# Patient Record
Sex: FEMALE | URBAN - METROPOLITAN AREA
[De-identification: names, ages, dates, MRNs, and addresses within clinical notes are randomized per-mention and may not be internally consistent; named-entity substitution may affect disease eponyms.]

---

## 2017-02-02 ENCOUNTER — OFFICE VISIT - HEALTHEAST (OUTPATIENT)
Dept: NEUROSURGERY | Facility: CLINIC | Age: 32
End: 2017-02-02

## 2017-02-02 DIAGNOSIS — M54.50 LOW BACK PAIN: ICD-10-CM

## 2017-12-31 ENCOUNTER — HEALTH MAINTENANCE LETTER (OUTPATIENT)
Age: 32
End: 2017-12-31

## 2018-01-16 ENCOUNTER — OFFICE VISIT (OUTPATIENT)
Dept: ORTHOPEDICS | Facility: CLINIC | Age: 33
End: 2018-01-16
Payer: COMMERCIAL

## 2018-01-16 DIAGNOSIS — M25.362 PATELLAR INSTABILITY OF LEFT KNEE: Primary | ICD-10-CM

## 2018-01-16 ASSESSMENT — ENCOUNTER SYMPTOMS
ARTHRALGIAS: 1
BACK PAIN: 1
STIFFNESS: 0
MUSCLE WEAKNESS: 0
NECK PAIN: 1
MYALGIAS: 0
JOINT SWELLING: 0
MUSCLE CRAMPS: 0

## 2018-01-16 NOTE — PROGRESS NOTES
Chief Concern/Diagnosis: Follow-up Bilateral patellar instability    Prior Surgeries:    1. None    Subjective:  Patient returns to see us 16 months since her last appointment. She reports that she did a short course of physical therapy, however did not keep up with her as she had a lumbar disc herniation which was the primary focus of her medical attention at that time.     She reports that she continues to do weight lifting as her primary mode of exercise. Unfortunate she continues to have bilateral patellar instability events, possibly 4 times per week. Injuries or events she is able quickly fire her quadriceps to stabilize the patella from Mason dislocating, however she notes that she did have a clay left knee subluxation/dislocation in October 2017 which caused her to have 2 months of pain after the fact.    She feels that both knees are equally affected by this instability. At baseline she has no pain at all, however after any subluxation event she has significant pain for a prolonged period which causes her to be very apprehensive.  Taping with physical therapy did prove to be very helpful and decreased her apprehension significantly.     She does not have any issues with ascending or descending stairs.      Physical Examination:    Gen:  Alert, no acute distress, well nourished, appears stated age    Psych:  Normal affect, nonpressured speech    Musculoskeletal:    RLE   No gross deformity, no effusion of knee   Nontender to palpation of entire LE   ROM:  Flex 0 - 141   Motor: Hip flexors, quadriceps, leg flexors, EHL, TA, FHL, GS 5/5   Moderate J sign as the patella engaged the trochlea   Apprehension with lateral patellar translation   3 quadrants patellar mobility laterally, patient stops prior to endpoint secondary to apprehension   Anterior & posterior drawer stable with no pain provoked   MCL/LCL stable without pain to valgus/varus stress   Circulation: palpable DP, TP, foot warm       LLE   No gross  deformity, no effusion of knee   Nontender to palpation of entire LE   ROM:  Flex 0 - 143   Motor: Hip flexors, quadriceps, leg flexors, EHL, TA, FHL, GS 5/5   Mild J sign as the patella engaged the trochlea   Apprehension with lateral patellar translation   3 quadrants patellar mobility laterally, patient stops prior to endpoint secondary to apprehension   Anterior & posterior drawer stable with no pain provoked   MCL/LCL stable without pain to valgus/varus stress   Circulation: palpable DP, TP, foot warm     Beighton score of 4/9 for bilateral elbows and thumbs. Of note no knee hyperextension is present bilaterally.    Imaging:  No new imaging present. Prior CT scan does show a TT TG on the right of 21 mm, left 22 mm. Femoral anteversion of 34 on the right, 12 on the left. Tibial torsion of 45 on the right, 41 on the left. No patella luis a.    Assessment:   32 year old female with the followin. Bilateral patellar instability.  No pain between subluxation/dislocation events.    Recommendations:  1. Discussed we could try another course of physical therapy focused on the patellofemoral articulation. The patient would like to proceed with this at [facility]. We'll place a referral for this today.   2.  we also discussed surgical options for management of her instability. In her case, though she does have some abnormal bony architecture, we do think that an isolated NPFL reconstruction with lateral retinacular lengthening would be indicated and successful on both sides.  The bilateral procedures could be spaced 6 weeks apart if she would like to do that.  We will do the preoperative teaching today if she would like to proceed with this near future.  3. The time being the patient would like to discuss with her work as well as her  regarding moving forward with the surgery. If she would like to proceed, she may give us a call or an email and we can set a surgery date.  4. The patient does have a history  of a TIA and we therefore recommend that she see her hematologist prior to surgery to discuss  5. If she does proceed with surgery, we anticipate 2-4 weeks she will need to be off of work depending on whether or not she may modify her work to be primarily sitting without pushing patient beds or being .    Total visit duration: 30 min. > 50% time spent in providing direct care and counseling.     Seen with Dr. Brown, documentation by:    Alejandro Bullard MD  Orthopaedic Surgery PGY-4    I have personally examined this patient and have reviewed the clinical presentation and progress note with the resident.  I agree with the treatment plan as outlined.  The plan was formulated with the resident on the day of the resident dictation.    Eunice Brown     Answers for HPI/ROS submitted by the patient on 1/16/2018   General Symptoms: No  Skin Symptoms: No  HENT Symptoms: No  EYE SYMPTOMS: No  HEART SYMPTOMS: No  LUNG SYMPTOMS: No  INTESTINAL SYMPTOMS: No  URINARY SYMPTOMS: No  GYNECOLOGIC SYMPTOMS: No  BREAST SYMPTOMS: No  SKELETAL SYMPTOMS: Yes  BLOOD SYMPTOMS: No  NERVOUS SYSTEM SYMPTOMS: No  MENTAL HEALTH SYMPTOMS: No  Back pain: Yes  Muscle aches: No  Neck pain: Yes  Swollen joints: No  Joint pain: Yes  Bone pain: No  Muscle cramps: No  Muscle weakness: No  Joint stiffness: No  Bone fracture: No

## 2018-01-16 NOTE — Clinical Note
1/16/2018       RE: Taina Sher  [address]  [place] [place] [facility] [address]     Dear Colleague,    Thank you for referring your patient, Taina Sher, to the [facility] at [facility]. Please see a copy of my visit note below.    Chief Concern/Diagnosis: Follow-up Bilateral patellar instability    Prior Surgeries:    1. None    Subjective:  Patient returns to see us 16 months since her last appointment. She reports that she did a short course of physical therapy, however did not keep up with her as she had a lumbar disc herniation which was the primary focus of her medical attention at that time.     She reports that she continues to do weight lifting as her primary mode of exercise. Unfortunate she continues to have bilateral patellar instability events, possibly 4 times per week. Injuries or events she is able quickly fire her quadriceps to stabilize the patella from Mason dislocating, however she notes that she did have a clay left knee subluxation/dislocation in October 2017 which caused her to have 2 months of pain after the fact.    She feels that both knees are equally affected by this instability. At baseline she has no pain at all, however after any subluxation event she has significant pain for a prolonged period which causes her to be very apprehensive.  Taping with physical therapy did prove to be very helpful and decreased her apprehension significantly.     She does not have any issues with ascending or descending stairs.      Physical Examination:    Gen:  Alert, no acute distress, well nourished, appears stated age    Psych:  Normal affect, nonpressured speech    Musculoskeletal:    RLE   No gross deformity, no effusion of knee   Nontender to palpation of entire LE   ROM:  Flex 0 - 141   Motor: Hip flexors, quadriceps, leg flexors, EHL, TA, FHL, GS 5/5   Moderate J sign as the patella engaged the trochlea   Apprehension with lateral  patellar translation   3 quadrants patellar mobility laterally, patient stops prior to endpoint secondary to apprehension   Anterior & posterior drawer stable with no pain provoked   MCL/LCL stable without pain to valgus/varus stress   Circulation: palpable DP, TP, foot warm       LLE   No gross deformity, no effusion of knee   Nontender to palpation of entire LE   ROM:  Flex 0 - 143   Motor: Hip flexors, quadriceps, leg flexors, EHL, TA, FHL, GS 5/5   Mild J sign as the patella engaged the trochlea   Apprehension with lateral patellar translation   3 quadrants patellar mobility laterally, patient stops prior to endpoint secondary to apprehension   Anterior & posterior drawer stable with no pain provoked   MCL/LCL stable without pain to valgus/varus stress   Circulation: palpable DP, TP, foot warm     Beighton score of 4/9 for bilateral elbows and thumbs. Of note no knee hyperextension is present bilaterally.    Imaging:  No new imaging present. Prior CT scan does show a TT TG on the right of 21 mm, left 22 mm. Femoral anteversion of 34 on the right, 12 on the left. Tibial torsion of 45 on the right, 41 on the left. No patella luis a.    Assessment:   32 year old female with the followin. Bilateral patellar instability.  No pain between subluxation/dislocation events.    Recommendations:  1. Discussed we could try another course of physical therapy focused on the patellofemoral articulation. The patient would like to proceed with this at [facility]. We'll place a referral for this today.   2.  we also discussed surgical options for management of her instability. In her case, though she does have some abnormal bony architecture, we do think that an isolated NPFL reconstruction with lateral retinacular lengthening would be indicated and successful on both sides.  The bilateral procedures could be spaced 6 weeks apart if she would like to do that.  We will do the preoperative teaching today if she would like  to proceed with this near future.  3. The time being the patient would like to discuss with her work as well as her  regarding moving forward with the surgery. If she would like to proceed, she may give us a call or an email and we can set a surgery date.  4. The patient does have a history of a TIA and we therefore recommend that she see her hematologist prior to surgery to discuss  5. If she does proceed with surgery, we anticipate 2-4 weeks she will need to be off of work depending on whether or not she may modify her work to be primarily sitting without pushing patient beds or being .    Total visit duration: 30 min. > 50% time spent in providing direct care and counseling.     Seen with Dr. Brown, documentation by:    Alejandro Bullard MD  Orthopaedic Surgery PGY-4    I have personally examined this patient and have reviewed the clinical presentation and progress note with the resident.  I agree with the treatment plan as outlined.  The plan was formulated with the resident on the day of the resident dictation.    Eunice Brown     Answers for HPI/ROS submitted by the patient on 1/16/2018   General Symptoms: No  Skin Symptoms: No  HENT Symptoms: No  EYE SYMPTOMS: No  HEART SYMPTOMS: No  LUNG SYMPTOMS: No  INTESTINAL SYMPTOMS: No  URINARY SYMPTOMS: No  GYNECOLOGIC SYMPTOMS: No  BREAST SYMPTOMS: No  SKELETAL SYMPTOMS: Yes  BLOOD SYMPTOMS: No  NERVOUS SYSTEM SYMPTOMS: No  MENTAL HEALTH SYMPTOMS: No  Back pain: Yes  Muscle aches: No  Neck pain: Yes  Swollen joints: No  Joint pain: Yes  Bone pain: No  Muscle cramps: No  Muscle weakness: No  Joint stiffness: No  Bone fracture: No      Again, thank you for allowing me to participate in the care of your patient.      Sincerely,    Eunice Brown MD

## 2018-01-16 NOTE — MR AVS SNAPSHOT
After Visit Summary   1/16/2018    Taina Sher    MRN: [id number]           Patient Information     Date Of Birth          1985        Visit Information        Provider Department      1/16/2018 3:30 PM Eunice Brown MD [facility]        Today's Diagnoses     Patellar instability of left knee    -  1       Follow-ups after your visit        Additional Services     PHYSICAL THERAPY REFERRAL (External-Prints)       Physical Therapy Referral    Strengthening, core exercises  Planning for bilateral MPFL reconstruction, LRL.  Both to be done within 4-6 weeks of eachother.                  Who to contact     Please call your clinic at [phone] to:    Ask questions about your health    Make or cancel appointments    Discuss your medicines    Learn about your test results    Speak to your doctor   If you have compliments or concerns about an experience at your clinic, or if you wish to file a complaint, please contact [facility] Patient Relations at [phone] or email us at [email or web address]         Additional Information About Your Visit        MyChart Information     [employer] gives you secure access to your electronic health record. If you see a primary care provider, you can also send messages to your care team and make appointments. If you have questions, please call your primary care clinic.  If you do not have a primary care provider, please call [phone] and they will assist you.      [employer] is an electronic gateway that provides easy, online access to your medical records. With [employer], you can request a clinic appointment, read your test results, renew a prescription or communicate with your care team.     To access your existing account, please contact your [facility] or call [phone] for assistance.        Care EveryWhere ID     This is your Care EveryWhere ID. This could be used  by other organizations to access your [place] medical records  [id number]         Blood Pressure from Last 3 Encounters:   No data found for BP    Weight from Last 3 Encounters:   No data found for Wt              Today, you had the following     No orders found for display       Primary Care Provider Fax #    Physician No Ref-Primary [phone]       No address on file        Equal Access to Services     DYLLAN MILLERRENA : Hadii aad ku hadpamelao Soomaali, waaxda luqadaha, qaybta kaalmada adegordoda, waxmorris sheldon medinabettie hullgeovany pereira kylah . So [facility] [phone].    ATENCIÓN: Si habla español, tiene a kwan disposición servicios gratuitos de asistencia lingüística. Llame al [phone].    We comply with applicable federal civil rights laws and [place] laws. We do not discriminate on the basis of race, color, national origin, age, disability, sex, sexual orientation, or gender identity.            Thank you!     Thank you for choosing [facility]  for your care. Our goal is always to provide you with excellent care. Hearing back from our patients is one way we can continue to improve our services. Please take a few minutes to complete the written survey that you may receive in the mail after your visit with us. Thank you!             Your Updated Medication List - Protect others around you: Learn how to safely use, store and throw away your medicines at www.disposemymeds.org.          This list is accurate as of: 1/16/18 11:59 PM.  Always use your most recent med list.                   Brand Name Dispense Instructions for use Diagnosis    ASPIRIN PO      Take 81 mg by mouth daily        IBUPROFEN PO      Take 600 mg by mouth daily        metoprolol succinate 12.5 mg Tabs half-tab    TOPROL-XL     Take 12.5 mg by mouth daily        SPIRONOLACTONE PO      Take 50 mg by mouth 2 times daily        WELLBUTRIN SR PO      Take 150 mg by mouth 2 times daily

## 2018-01-16 NOTE — LETTER
Date:January 24, 2018      Patient was self referred, no letter generated. Do not send.        [facility] Health Information

## 2018-01-16 NOTE — NURSING NOTE
Reason For Visit:   Chief Complaint   Patient presents with     RECHECK     bilateral knee pain       Primary MD: None  Referring MD: Chad Celeste    ?  No    Occupation: Nurse Anaesthetics.  Currently working? Yes.  Work status?  Full time.    Date of injury: None    Date of surgery: None  Type of surgery: NA    Smoker: No      Pain Assessment  Patient Currently in Pain: Denies (Pt reports only having pain when knees dislocate, )    Daina Vázquez CMA  1/16/2018

## 2018-01-17 NOTE — NURSING NOTE
Teaching Flowsheet   Relevant Diagnosis: bilateral patella instability  Teaching Topic:  Left MPFL recontruction and LRL, and then Right knee MPFL reconstruction and LRL     Person(s) involved in teaching:   Patient     Motivation Level:  Asks Questions: Yes  Eager to Learn: Yes  Cooperative: Yes  Receptive (willing/able to accept information): Yes  Any cultural factors/Nondenominational beliefs that may influence understanding or compliance? No     Patient demonstrates understanding of the following:  Reason for the appointment, diagnosis and treatment plan: Yes  Knowledge of proper use of medications and conditions for which they are ordered (with special attention to potential side effects or drug interactions): Yes  Which situations necessitate calling provider and whom to contact: Yes     Teaching Concerns Addressed:   Comments: Patient understands she will need a pre-op H&P completed within 30 days of surgery.    Patient has a history of TIA in 2003.  She takes a daily aspirin.  She will consult with her hematologist prior to surgery regarding her aspirin and then also post op recommendations.     Nutritional needs and diet plan: Yes  Pain management techniques: Yes  Wound Care: Yes  How and/when to access community resources: Yes     Instructional Materials Used/Given: Pre-op packet given and reviewed with patient.  Antiseptic soap given.  Patient would like to do the two surgeries 4 weeks apart.  Planning on taking off between 2-4 weeks for each procedure (total of 8 weeks if done 4 weeks apart).  She will send in FMLA and short term disability paperwork.  Alcira, our surgery scheduler, will call patient with available dates.  She has our phone number for further questions or concerns.     Time spent with patient: 15 minutes.

## 2018-01-24 ENCOUNTER — TELEPHONE (OUTPATIENT)
Dept: ORTHOPEDICS | Facility: CLINIC | Age: 33
End: 2018-01-24

## 2020-03-10 ENCOUNTER — HEALTH MAINTENANCE LETTER (OUTPATIENT)
Age: 35
End: 2020-03-10

## 2020-12-27 ENCOUNTER — HEALTH MAINTENANCE LETTER (OUTPATIENT)
Age: 35
End: 2020-12-27

## 2021-04-24 ENCOUNTER — HEALTH MAINTENANCE LETTER (OUTPATIENT)
Age: 36
End: 2021-04-24

## 2021-06-08 NOTE — PROGRESS NOTES
NEUROSURGERY FOLLOWUP  NOTE    Taina Sher comes today in f/u after prior apt for leg pain which responded to an CAN L34 SPR.  Good relief 2 mos.   New pain started Dec after a day of high heals in the L anterior groin/hip (was triggered with pressure on SI joint)   which has now resolved.  Now has LBP and pain around her ankles and toes both sides.   Started around the same time as the left  Hip/groin pain.  LBP hurts more than the leg LBP limits.  LBP triggered with sitting and driving (not dynamic movement).   Best laying on side with legs curled, walking ok.   Medrol dose pack helped.   No weakness no numbness.  Pain worse in great toes with pins and needles.            The pts PMH, PSH, ROS, Meds, Allergies, SH, FH are all unchanged and summarized in the pts health history from last visit        PHYSICAL EXAM:   Constitutional: There were no vitals taken for this visit.     Mental Status: A & O in no acute distress.  Affect is appropriate.  Speech is fluent.  Recent and remote memory are intact.  Attention span and concentration are normal.     Cranial Nerves: CN1: grossly intact per patient recall. CN2: No funduscopic exam performed. CN3,4 & 6: Pupillary light response, lateral and vertical gaze normal.  No nystagmus.  Visual fields are full to confrontation. CN5: Intact to touch CN7: No facial weakness, smile, facial symmetry intact. CN8: Intact to spoken voice. CN9&10: Gag reflex, uvula midline, palate rises with phonation. CN11: Shoulder shrug 5/5 intact bilaterally. CN12: Tongue midline and moves freely from side to side.     Motor: No pronator drift of upper extremity. Normal bulk and tone all muscle groups of upper and lower extremities.     Sensory: Sensation intact bilaterally to light touch.       Coordination:   Heel/toe/ gait intact. okn tandem gait      Reflexes; supinator, biceps, triceps, knee/ ankle jerk intact.  no hoffmans/   no babinski/ clonus.  SLR positive    IMAGING:   I personally  reviewed all radiographic images     MRI with central/left disk at L34 causing central stenosis     CONSULTATION ASSESSMENT AND PLAN:  Given that the last injection done at L34 completely relieved her LBP, I think it would be reasonable to try again.  I am not sure why her symptoms are so unusually (ankle and foot pain with LBP but no radicular pain) and I would ask her to pay close attention to the response of the LBP with the injection) .  We will send her back to PT as well.      If there is a short term response to the injection, we can always perform a microdiskectomy (theory that the disk  is causing central stenosis and therefore bilateral leg pain)  but I think a fusion in a young pt with hx of multiple levels of prior disease at other levels may be a poor long term choice.       I spent more than 30 minutes in this apt, examining the pt, reviewing the scans, reviewing notes from chart, discussing treatment options with risks and benefits and coordinating care. >50 % clinic time was spent in face to face counseling and coordinating care    Hellen Milligan      CC:     Bjorn De La Cruz MD  [address] [place] [address]

## 2021-06-08 NOTE — PROGRESS NOTES
"Taina Sher was last seen on 8/23/2016 for her back and bilateral leg pain associated with central disc herniation at L3-4.  She has had a left L3-4 TFESI done at [facility] on 9/13/2016 with approximately 2 months of pain relief.  Today she returns with an updated MRI scan. She reports a \"new\" pain in her left anterior and lateral SI joint region. Still has bilateral leg pain L>R associated with paresthesias in her feet. Denies weakness. Gait and balance are normal.  Chayo Bacon RN, CNRN    "

## 2021-10-09 ENCOUNTER — HEALTH MAINTENANCE LETTER (OUTPATIENT)
Age: 36
End: 2021-10-09

## 2022-05-16 ENCOUNTER — HEALTH MAINTENANCE LETTER (OUTPATIENT)
Age: 37
End: 2022-05-16

## 2022-09-11 ENCOUNTER — HEALTH MAINTENANCE LETTER (OUTPATIENT)
Age: 37
End: 2022-09-11

## 2023-06-03 ENCOUNTER — HEALTH MAINTENANCE LETTER (OUTPATIENT)
Age: 38
End: 2023-06-03